# Patient Record
Sex: FEMALE | Employment: UNEMPLOYED | URBAN - METROPOLITAN AREA
[De-identification: names, ages, dates, MRNs, and addresses within clinical notes are randomized per-mention and may not be internally consistent; named-entity substitution may affect disease eponyms.]

---

## 2022-02-10 ENCOUNTER — TELEPHONE (OUTPATIENT)
Dept: NEUROLOGY | Facility: CLINIC | Age: 62
End: 2022-02-10

## 2022-02-10 NOTE — TELEPHONE ENCOUNTER
----- Message from Ellyn Fang sent at 2/10/2022 11:17 AM CST -----  Regarding: Patient Advice  Contact: Pt [phone]  Patient spouse is calling Dr. Dillon's office in regards to if referral was received. Please call. [phone]

## 2022-02-11 ENCOUNTER — TELEPHONE (OUTPATIENT)
Dept: NEUROLOGY | Facility: CLINIC | Age: 62
End: 2022-02-11

## 2022-02-12 NOTE — TELEPHONE ENCOUNTER
----- Message from Ellyn Fang sent at 2/10/2022 11:17 AM CST -----  Regarding: Patient Advice  Contact: Pt [phone]  Patient spouse is calling Dr. Dillon's office in regards to if referral was received. Please call. [phone]

## 2022-02-12 NOTE — TELEPHONE ENCOUNTER
Returned call, spoke with pt's  Aguilar. Pt had a stroke in June and they are looking to schedule an appt.

## 2022-03-03 ENCOUNTER — OFFICE VISIT (OUTPATIENT)
Dept: NEUROLOGY | Facility: CLINIC | Age: 62
End: 2022-03-03
Payer: OTHER GOVERNMENT

## 2022-03-03 VITALS — DIASTOLIC BLOOD PRESSURE: 82 MMHG | HEART RATE: 73 BPM | SYSTOLIC BLOOD PRESSURE: 184 MMHG | WEIGHT: 159.63 LBS

## 2022-03-03 DIAGNOSIS — Z86.73 HISTORY OF STROKE: Primary | ICD-10-CM

## 2022-03-03 DIAGNOSIS — I10 PRIMARY HYPERTENSION: ICD-10-CM

## 2022-03-03 DIAGNOSIS — G89.29 CHRONIC MIDLINE LOW BACK PAIN WITH SCIATICA, SCIATICA LATERALITY UNSPECIFIED: ICD-10-CM

## 2022-03-03 DIAGNOSIS — E11.49 TYPE 2 DIABETES MELLITUS WITH OTHER NEUROLOGIC COMPLICATION, WITHOUT LONG-TERM CURRENT USE OF INSULIN: ICD-10-CM

## 2022-03-03 DIAGNOSIS — Z72.0 TOBACCO ABUSE: ICD-10-CM

## 2022-03-03 DIAGNOSIS — M54.40 CHRONIC MIDLINE LOW BACK PAIN WITH SCIATICA, SCIATICA LATERALITY UNSPECIFIED: ICD-10-CM

## 2022-03-03 DIAGNOSIS — G89.0 CENTRAL PAIN SYNDROME: ICD-10-CM

## 2022-03-03 PROCEDURE — 99999 PR PBB SHADOW E&M-EST. PATIENT-LVL III: ICD-10-PCS | Mod: PBBFAC,,, | Performed by: PSYCHIATRY & NEUROLOGY

## 2022-03-03 PROCEDURE — 99204 PR OFFICE/OUTPT VISIT, NEW, LEVL IV, 45-59 MIN: ICD-10-PCS | Mod: S$PBB,,, | Performed by: PSYCHIATRY & NEUROLOGY

## 2022-03-03 PROCEDURE — 99204 OFFICE O/P NEW MOD 45 MIN: CPT | Mod: S$PBB,,, | Performed by: PSYCHIATRY & NEUROLOGY

## 2022-03-03 PROCEDURE — 99999 PR PBB SHADOW E&M-EST. PATIENT-LVL III: CPT | Mod: PBBFAC,,, | Performed by: PSYCHIATRY & NEUROLOGY

## 2022-03-03 PROCEDURE — 99213 OFFICE O/P EST LOW 20 MIN: CPT | Mod: PBBFAC | Performed by: PSYCHIATRY & NEUROLOGY

## 2022-03-03 RX ORDER — HYDRALAZINE HYDROCHLORIDE 50 MG/1
50 TABLET, FILM COATED ORAL 3 TIMES DAILY
COMMUNITY
Start: 2021-10-04

## 2022-03-03 RX ORDER — LISINOPRIL 20 MG/1
20 TABLET ORAL 2 TIMES DAILY
COMMUNITY
Start: 2021-12-13

## 2022-03-03 RX ORDER — METFORMIN HYDROCHLORIDE 500 MG/1
500 TABLET ORAL
COMMUNITY
Start: 2021-06-25

## 2022-03-03 RX ORDER — MIRTAZAPINE 15 MG/1
TABLET, FILM COATED ORAL
COMMUNITY
Start: 2021-12-07

## 2022-03-03 RX ORDER — SUCRALFATE 1 G/1
1 TABLET ORAL 4 TIMES DAILY
COMMUNITY
Start: 2022-02-25 | End: 2023-02-24

## 2022-03-03 RX ORDER — ASPIRIN 81 MG/1
81 TABLET ORAL
COMMUNITY
Start: 2021-06-25

## 2022-03-03 NOTE — PATIENT INSTRUCTIONS
- Increase Cymbalta to 20 mg twice a day for nerve pain.  Do not take Cymbalta with Lyrica (pregabalin) or Gabapentin.  - Continue aspirin 81 mg daily and atorvastatin for stroke prevention.  - Aggressive control of blood pressure (goal <140/80).      Mediterranean Diet Recommendations    Eat primarily plant-based foods, such as fruits and vegetables, whole grains, legumes (beans) and nuts.  Limit refined carbohydrates (white pasta, bread, rice).  Replace butter with healthy fats such as olive oil.  Use herbs and spices instead of salt to flavor foods.  Limit red meat and processed meats to no more than a few times a month.  Avoid sugary sodas, bakery goods, and sweets.  Eat fish and poultry at least twice a week.  Get plenty of exercise (150 minutes per week).    Adopted from [employer], [employer], 2018.

## 2022-03-03 NOTE — PROGRESS NOTES
Vascular Neurology  Clinic Note    Reason For Visit (Chief Complaint): lacunar stroke    HPI: 61 y.o. left handed woman with PMH HTN, HLD, T2DM, GERD, lumbar spinal stenosis, with admission in June 2021 for stroke.  Here for Vascular Neurology evaluation.    Presented to hospital in [place] in June for slurred speech, numbness, and stumbling.  Daughter noticed symptoms and took her to ER.  Workup revealed acute infarct in L corona radiata.  She was started on ASA, statin and discharged home (details of admission/records not available).    R leg still mildly weak.  Taking Aspirin monotherapy.  Also bothered by R facial and hand numbness.  Reports GBP didn't help, as thinks she took Lyrica without improvement.  Does report some mild depression - wants to stay in bed.  Takes mirtazapine but hasn't noticed much improvement.  Poor appetite, but trying to supplement with Ensure.  No longer doing any therapy - discharged from PT/OT.  Ambulates with cane.      Patient did not bring updated medication list and is not sure exactly which medications she is taking.  Discharge summary from recent admission for GI issues lists gabapentin, Lyrica, Cymbalta, and Remeron as active medications.    Brain Imaging:  Reports only  MRI Brain 6/6/21:  There is restricted diffusion in the left parietal corona radiata with   accompanying increased T2/FLAIR signal, compatible with acute infarct.     Carotid U/S 6/6/21:  No hemodynamically significant carotid stenosis identified.       Cardiac Evaluation:  No results found for this or any previous visit.    Relevant Labwork:  None     I independently viewed the above imaging studies in addition to reviewing the report.  I reviewed the above labwork.    Review of Systems  Gen: + weight loss  Msk: negative for muscle pain  Skin: negative for pruritis  Neuro: + weakness, numbness  All others negative    Past Medical History  T2DM  Lumbar stenosis  HTN  GERD     Family History  Father with lung  cancer.    Social History  Lives in [place] with .  Former smoker - quit about a week ago (was previously smoking 1/4 pack per day).        Current Outpatient Medications on File Prior to Visit   Medication Sig Dispense Refill    aspirin (ECOTRIN) 81 MG EC tablet 81 mg.      hydrALAZINE (APRESOLINE) 50 MG tablet Take 50 mg by mouth 3 (three) times daily.      INV dulaglutide 0.75 mg/0.5 mL injection Inject into the skin. FOR INVESTIGATIONAL USE ONLY      lisinopriL (PRINIVIL,ZESTRIL) 20 MG tablet Take 20 mg by mouth 2 (two) times daily.      metFORMIN (GLUCOPHAGE) 500 MG tablet 500 mg.      REMERON 15 mg tablet       sucralfate (CARAFATE) 1 gram tablet Take 1 tablet by mouth 4 (four) times daily.       No current facility-administered medications on file prior to visit.       EXAM  Vital Signs  Pulse: 73  BP: (!) 184/82  Pain Score: 0-No pain  Height and Weight  Weight: 72.4 kg (159 lb 9.8 oz)]  General: well appearing without discomfort   Neck: no carotid bruits  CV: RRR, nL S1&S2  Resp: breathing comfortably, no wheezing  Ext: wwp, no pedal edema    Mental Status: Alert and oriented, normal attention, speech fluent and prosodic, naming and repetition intact, follows multistep embedded commands, no e/o neglect or extinction  Cranial Nerves: PERRL, EOMI, VFF, sensation reduced R face, face symmetric, TUP midline, SCM/trap 5/5  Motor: Normal bulk and tone, no drift, finger taps symmetric  Strength 5/5 throughout  Sensory:  Reduced to LT in R hand  Coordination: no ataxia on finger-to-nose  Gait & Stance: normal  DTR: 3+ biceps w/ spread, 3+ patellar        ___________________  ASSESSMENT & PLAN  61 y.o. left handed woman with PMH HTN, HLD, T2DM, GERD, lumbar spinal stenosis, with admission in June 2021 for small vessel stroke.  Here for Vascular Neurology evaluation.  No significant motor weakness, but does have painful central pain/paresthesias.  Unclear which neuropathic pain medications patient is  currently taking as she does not have up to date med list or medication bottles with her.  Recommend uptitrating Cymbalta to 20 mg bid (per discharge summary instructions) and stopping GBP and Lyrica.  No records/labs available to unclear if risk factors currently controlled.    - Continue ASA 81, atorvastatin for stroke prevention.  - Continue to abstain from smoking.  - Increase Cymbalta to 20 mg twice a day for post-stroke pain.  Stop Lyrica and GBP.  - Aggressive control of HTN (BP elevated today).  - Mediterranean Diet for stroke prevention.  - RTC PRN.      Problem List Items Addressed This Visit        Unprioritized    History of stroke - Primary    Primary hypertension    Tobacco abuse    Chronic low back pain with sciatica    Type 2 diabetes mellitus with neurologic complication, without long-term current use of insulin    Relevant Medications    metFORMIN (GLUCOPHAGE) 500 MG tablet    INV dulaglutide 0.75 mg/0.5 mL injection    Central pain syndrome          Cristal Campbell MD  Vascular Neurology

## 2022-03-04 PROBLEM — G89.0 CENTRAL PAIN SYNDROME: Status: ACTIVE | Noted: 2022-03-04

## 2022-03-04 PROBLEM — Z72.0 TOBACCO ABUSE: Status: ACTIVE | Noted: 2022-03-04

## 2022-03-04 PROBLEM — G89.29 CHRONIC LOW BACK PAIN WITH SCIATICA: Status: ACTIVE | Noted: 2022-03-04

## 2022-03-04 PROBLEM — I10 PRIMARY HYPERTENSION: Status: ACTIVE | Noted: 2022-03-04

## 2022-03-04 PROBLEM — M54.40 CHRONIC LOW BACK PAIN WITH SCIATICA: Status: ACTIVE | Noted: 2022-03-04

## 2022-03-04 PROBLEM — E11.49 TYPE 2 DIABETES MELLITUS WITH NEUROLOGIC COMPLICATION, WITHOUT LONG-TERM CURRENT USE OF INSULIN: Status: ACTIVE | Noted: 2022-03-04

## 2022-03-04 PROBLEM — Z86.73 HISTORY OF STROKE: Status: ACTIVE | Noted: 2022-03-04
